# Patient Record
(demographics unavailable — no encounter records)

---

## 2022-07-18 LAB
APPEARANCE: CLEAR
BILIRUBIN, URINE, POC: ABNORMAL
BLOOD URINE, POC: ABNORMAL
GLUCOSE BLD-MCNC: 81 MG/DL (ref 65–110)
GLUCOSE URINE, POC: NEGATIVE MG/DL
KETONES, URINE, POC: NEGATIVE MG/DL
LEUKOCYTE EST, POC: NEGATIVE
NITRATE, URINE POC: NEGATIVE
PH, URINE, POC: 6 (ref 4.5–8)
PREGNANCY TEST URINE, POC: NEGATIVE
PROTEIN,URINE, POC: 100
SPECIFIC GRAVITY, URINE, POC: >=1.03 (ref 1–1.03)
URINALYSIS COLOR, POC: YELLOW
UROBILIN U POC: 0.2 EU/DL

## 2022-10-19 NOTE — ED NOTES
ED Patient Education Note     Patient Education Materials Follows:  Neurology     Syncope      Syncope is when you pass out (faint) for a short time. It is caused by a sudden decrease in blood flow to the brain. Signs that you may be about to pass out include:   Feeling dizzy or light-headed. Feeling sick to your stomach (nauseous). Seeing all white or all black. Having cold, clammy skin. If you pass out, get help right away. Call your local emergency services (911 in the [place] Do not drive yourself to the hospital.      Follow these instructions at home:    Watch for any changes in your symptoms. Take these actions to stay safe and help with your symptoms:    Lifestyle     Do not drive, use machinery, or play sports until your doctor says it is okay. Do not drink alcohol. Do not use any products that contain nicotine or tobacco, such as cigarettes and e-cigarettes. If you need help quitting, ask your doctor. Drink enough fluid to keep your pee (urine) pale yellow. General instructions     Take over-the-counter and prescription medicines only as told by your doctor. If you are taking blood pressure or heart medicine, sit up and stand up slowly. Spend a few minutes getting ready to sit and then stand. This can help you feel less dizzy. Have someone stay with you until you feel stable. If you start to feel like you might pass out, lie down right away and raise (elevate) your feet above the level of your heart. Breathe deeply and steadily. Wait until all of the symptoms are gone. Keep all follow-up visits as told by your doctor. This is important. Get help right away if:     You have a very bad headache. You pass out once or more than once. You have pain in your chest, belly, or back. You have a very fast or uneven heartbeat (palpitations). It hurts to breathe. You are bleeding from your mouth or your bottom (rectum).      You have black or tarry poop (stool). You have jerky movements that you cannot control (seizure). You are confused. You have trouble walking. You are very weak. You have vision problems. These symptoms may be an emergency. Do not wait to see if the symptoms will go away. Get medical help right away. Call your local emergency services (911 in the [place] Do not drive yourself to the hospital.      Summary     Syncope is when you pass out (faint) for a short time. It is caused by a sudden decrease in blood flow to the brain. Signs that you may be about to faint include feeling dizzy, light-headed, or sick to your stomach, seeing all white or all black, or having cold, clammy skin. If you start to feel like you might pass out, lie down right away and raise (elevate) your feet above the level of your heart. Breathe deeply and steadily. Wait until all of the symptoms are gone. This information is not intended to replace advice given to you by your health care provider. Make sure you discuss any questions you have with your health care provider. Document Revised: 01/27/2021 Document Reviewed: 01/30/2019  [employer] Patient Education ? [id number] [address].      Nutrition     Dehydration, Adult    Dehydration is condition in which there is not enough water or other fluids in the body. This happens when a person loses more fluids than he or she takes in. Important body parts cannot work right without the right amount of fluids. Any loss of fluids from the body can cause dehydration. Dehydration can be mild, worse, or very bad. It should be treated right away to keep it from getting very bad. What are the causes? This condition may be caused by:   Conditions that cause loss of water or other fluids, such as:   ? Watery poop (diarrhea). ? Vomiting. ? Sweating a lot. ? Peeing (urinating) a lot. Not drinking enough fluids, especially when you:   ? Are ill.     ? Are doing things that take a lot of energy to do. Other illnesses and conditions, such as fever or infection. Certain medicines, such as medicines that take extra fluid out of the body (diuretics). Lack of safe drinking water. Not being able to get enough water and food. What increases the risk? The following factors may make you more likely to develop this condition:   Having a long-term (chronic) illness that has not been treated the right way, such as:  ? Diabetes. ? Heart disease. ? Kidney disease. Being 72years of age or older. Having a disability. Living in a place that is high above the ground or sea (high in altitude). The thinner, dried air causes more fluid loss. Doing exercises that put stress on your body for a long time. What are the signs or symptoms? Symptoms of dehydration depend on how bad it is. Mild or worse dehydration     Thirst.     Dry lips or dry mouth. Feeling dizzy or light-headed, especially when you stand up from sitting. Muscle cramps. Your body making:  ? Dark pee (urine). Pee may be the color of tea. ? Less pee than normal.    ? Less tears than normal.       Headache. Very bad dehydration     Changes in skin. Skin may:  ? Be cold to the touch (clammy). ? Be blotchy or pale. ? Not go back to normal right after you lightly pinch it and let it go. Little or no tears, pee, or sweat. Changes in vital signs, such as:  ? Fast breathing. ? Low blood pressure. ? Weak pulse. ? Pulse that is more than 100 beats a minute when you are sitting still. Other changes, such as:  ? Feeling very thirsty. ? Eyes that look hollow (sunken). ? Cold hands and feet. ? Being mixed up (confused). ? Being very tired (lethargic) or having trouble waking from sleep. ? Short-term weight loss. ? Loss of consciousness. How is this treated? Treatment for this condition depends on how bad it is. medicines only as told by your doctor. Do not take salt tablets. Doing that can make the salt level in your body get too high. Return to your normal activities as told by your doctor. Ask your doctor what activities are safe for you. Keep all follow-up visits as told by your doctor. This is important. Contact a doctor if:     You have pain in your belly (abdomen) and the pain:  ? Gets worse. ? Stays in one place. You have a rash. You have a stiff neck. You get angry or annoyed (irritable) more easily than normal.     You are more tired or have a harder time waking than normal.     You feel:  ? Weak or dizzy. ? Very thirsty. Get help right away if you have: Any symptoms of very bad dehydration. Symptoms of vomiting, such as:  ? You cannot eat or drink without vomiting. ? Your vomiting gets worse or does not go away. ? Your vomit has blood or green stuff in it. Symptoms that get worse with treatment. A fever. A very bad headache. Problems with peeing or pooping (having a bowel movement), such as:  ? Watery poop that gets worse or does not go away. ? Blood in your poop (stool). This may cause poop to look black and tarry. ? Not peeing in 6?8 hours. ? Peeing only a small amount of very dark pee in 6?8 hours. Trouble breathing. These symptoms may be an emergency. Do not wait to see if the symptoms will go away. Get medical help right away. Call your local emergency services (911 in the [place] Do not drive yourself to the hospital.      Summary     Dehydration is a condition in which there is not enough water or other fluids in the body. This happens when a person loses more fluids than he or she takes in. Treatment for this condition depends on how bad it is. Treatment should be started right away. Do not wait until your condition gets very bad. Drink enough clear fluid to keep your pee pale yellow.  If you were told to drink an oral rehydration solution (ORS), finish the ORS first. Then, start slowly drinking other clear fluids. Take over-the-counter and prescription medicines only as told by your doctor. Get help right away if you have any symptoms of very bad dehydration. This information is not intended to replace advice given to you by your health care provider. Make sure you discuss any questions you have with your health care provider. Document Revised: 07/30/2020 Document Reviewed: 07/30/2020  [employer] Patient Education ?  [id number] [address].
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
## 2022-10-19 NOTE — NURSING NOTE
Orthostatics - Text       Orthostatics Entered On:  7/18/2022 6:50 EDT    Performed On:  7/18/2022 6:47 EDT by Rachel ORANTES               Orthostatics   Systolic/Diastolic  Supine BP :   572 mmHg   Systolic/Diastolic  Supine BP :   84 mmHg   Pulse Supine :   51 bpm (LOW)    Systolic/Diastolic  Standing BP :   809 mmHg   Systolic/Diastolic  Standing BP :   80 mmHg   Pulse Standing :   67 bpm   Rachel ORANTES - 7/18/2022 6:47 EDT